# Patient Record
(demographics unavailable — no encounter records)

---

## 2025-01-29 NOTE — ASSESSMENT
[FreeTextEntry1] : 68-year-old woman with a past history of renal disease, whose creatinine patrciia between October and January, hopefully as a result of her acute illness 3 weeks ago.  I am sending her to the lab today for BMP, Cystatin C, A1c, urine microalbumin, double-stranded DNA.  She will see Dr. Wilkins on February 5.  I have held off on ordering a renal ultrasound until we see her labs.  If her creatinine and GFR have returned to baseline, I might hold off on that.

## 2025-01-29 NOTE — ASSESSMENT
[FreeTextEntry1] : 68-year-old woman with a past history of renal disease, whose creatinine patricia between October and January, hopefully as a result of her acute illness 3 weeks ago.  I am sending her to the lab today for BMP, Cystatin C, A1c, urine microalbumin, double-stranded DNA.  She will see Dr. Wilkins on February 5.  I have held off on ordering a renal ultrasound until we see her labs.  If her creatinine and GFR have returned to baseline, I might hold off on that.

## 2025-01-29 NOTE — HISTORY OF PRESENT ILLNESS
[FreeTextEntry1] : 68-year-old woman referred by Dr. Jesse Wilkins, because of an elevated creatinine.  She was seen in the ER at [facility] on January 3, with what sounds like a viral syndrome, characterized by fever, congestion, shortness of breath and fatigue.  Her creatinine was 1.7, with a BUN of 16, K4.2, CO2 21, GFR 32, hemoglobin 14.5, and 2+ protein on urinalysis with no abnormal sediment, no microscopic hematuria or pyuria.  Her BP was 152/83.  She has known of renal disease for about 20 years, and had preeclampsia twice as well as gestational diabetes.  She is allergic to lisinopril.  She is not sure about her antihypertensive meds because they have changed.  She is on Jardiance.  She has a history of Sjogren's but no lupus or rheumatoid arthritis.  She is a smoker.  Fortunately, her kidney function was much better, with a creatinine of 1.24 in February 2024 and 1.32 in October 2024.  On CT scan, she had aortic and mitral valve calcifications noted.  They gave her prednisone for a week and her weight increased from 210 up to 225.

## 2025-01-29 NOTE — PHYSICAL EXAM
[General Appearance - Alert] : alert [General Appearance - In No Acute Distress] : in no acute distress [Sclera] : the sclera and conjunctiva were normal [Outer Ear] : the ears and nose were normal in appearance [Neck Appearance] : the appearance of the neck was normal [Neck Cervical Mass (___cm)] : no neck mass was observed [Jugular Venous Distention Increased] : there was no jugular-venous distention [Auscultation Breath Sounds / Voice Sounds] : lungs were clear to auscultation bilaterally [Heart Rate And Rhythm] : heart rate was normal and rhythm regular [Heart Sounds] : normal S1 and S2 [Heart Sounds Gallop] : no gallops [Murmurs] : no murmurs [Heart Sounds Pericardial Friction Rub] : no pericardial rub [Skin Color & Pigmentation] : normal skin color and pigmentation [Skin Turgor] : normal skin turgor [] : no rash [No Focal Deficits] : no focal deficits

## 2025-01-29 NOTE — CONSULT LETTER
[Dear  ___] : Dear  [unfilled], [Consult Letter:] : I had the pleasure of evaluating your patient, [unfilled]. [Please see my note below.] : Please see my note below. [Consult Closing:] : Thank you very much for allowing me to participate in the care of this patient.  If you have any questions, please do not hesitate to contact me. [Sincerely,] : Sincerely, [FreeTextEntry2] : Dr Jesse Wilkins [FreeTextEntry3] : Sincerely,   Jovani Pierre MD, FACP

## 2025-01-29 NOTE — HISTORY OF PRESENT ILLNESS
[FreeTextEntry1] : 68-year-old woman referred by Dr. Jesse Wilkins, because of an elevated creatinine.  She was seen in the ER at [facility] on January 3, with what sounds like a viral syndrome, characterized by fever, congestion, shortness of breath and fatigue.  Her creatinine was 1.7, with a BUN of 16, K4.2, CO2 21, GFR 32, hemoglobin 14.5, and 2+ protein on urinalysis with no abnormal sediment, no microscopic hematuria or pyuria.  Her BP was 152/83.  She has known of renal disease for about 20 years, and had preeclampsia twice as well as gestational diabetes.  She is allergic to lisinopril.  She is not sure about her antihypertensive meds because they have changed.  She is on Jardiance.  She has a history of Sjogren's but no lupus or rheumatoid arthritis.  She is a smoker.  Fortunately, her kidney function was much better, with a creatinine of 1.24 in February 2024 and 1.32 in October 2024.  On CT scan, she had aortic and mitral valve calcifications noted.  They gave her prednisone for a week and her weight increased from 210 up to 225.

## 2025-07-28 NOTE — HISTORY OF PRESENT ILLNESS
[FreeTextEntry1] : 68-year-old woman referred by Dr. Jesse Wilkins because of an elevated creatinine.  It had been as high as 1.7, but as of last month, was down to 1.41, with a GFR of 41, K4.6, CO2 23, BUN 12.  Her A1c was 7.8.  Despite Jardiance 10 mg daily.  Her BP has been highly variable and she checks it at home but I believe she is using a standard size cuff with a large arm circumference much greater than 33 cm.  So her cuff is likely overestimating.  Her BP at home 2 days ago was at 174/90.  She is on losartan 100 mg daily.  She does have proteinuria despite Jardiance and losartan.  I would like to consider addition of Kerendia as the third pillar of diabetic nephropathy protection.

## 2025-07-28 NOTE — ASSESSMENT
[FreeTextEntry1] : 68-year-old woman with stable renal function, variable BP, which may be falsely elevated by her and adequately sized home device, but still appears to be suboptimal.  I am adding amlodipine 5 mg daily, and would like to consider adding Kerendia, and possibly a GLP-1 agonist because of her A1c and obesity.  I have ordered labs to include urine microalbumin, A1c, BMP, Cystatin C, PTH prior to her next visit.
